# Patient Record
Sex: FEMALE | Race: WHITE | NOT HISPANIC OR LATINO | Employment: UNEMPLOYED | URBAN - METROPOLITAN AREA
[De-identification: names, ages, dates, MRNs, and addresses within clinical notes are randomized per-mention and may not be internally consistent; named-entity substitution may affect disease eponyms.]

---

## 2018-08-20 ENCOUNTER — HOSPITAL ENCOUNTER (INPATIENT)
Facility: MEDICAL CENTER | Age: 23
LOS: 7 days | End: 2018-08-27
Attending: OBSTETRICS & GYNECOLOGY | Admitting: OBSTETRICS & GYNECOLOGY
Payer: OTHER GOVERNMENT

## 2018-08-20 LAB
ABO GROUP BLD: NORMAL
ABO GROUP BLD: NORMAL
ALBUMIN SERPL BCP-MCNC: 3.2 G/DL (ref 3.2–4.9)
ALBUMIN/GLOB SERPL: 1.1 G/DL
ALP SERPL-CCNC: 132 U/L (ref 30–99)
ALT SERPL-CCNC: 17 U/L (ref 2–50)
ANION GAP SERPL CALC-SCNC: 9 MMOL/L (ref 0–11.9)
AST SERPL-CCNC: 23 U/L (ref 12–45)
BILIRUB SERPL-MCNC: 0.2 MG/DL (ref 0.1–1.5)
BLD GP AB SCN SERPL QL: NORMAL
BUN SERPL-MCNC: 12 MG/DL (ref 8–22)
CALCIUM SERPL-MCNC: 8.7 MG/DL (ref 8.5–10.5)
CHLORIDE SERPL-SCNC: 104 MMOL/L (ref 96–112)
CO2 SERPL-SCNC: 20 MMOL/L (ref 20–33)
CREAT SERPL-MCNC: 0.75 MG/DL (ref 0.5–1.4)
ERYTHROCYTE [DISTWIDTH] IN BLOOD BY AUTOMATED COUNT: 43.8 FL (ref 35.9–50)
GLOBULIN SER CALC-MCNC: 2.9 G/DL (ref 1.9–3.5)
GLUCOSE SERPL-MCNC: 78 MG/DL (ref 65–99)
HCT VFR BLD AUTO: 38.7 % (ref 37–47)
HGB BLD-MCNC: 13.1 G/DL (ref 12–16)
MAGNESIUM SERPL-MCNC: 3.8 MG/DL (ref 1.5–2.5)
MAGNESIUM SERPL-MCNC: 4.9 MG/DL (ref 1.5–2.5)
MCH RBC QN AUTO: 31.2 PG (ref 27–33)
MCHC RBC AUTO-ENTMCNC: 33.9 G/DL (ref 33.6–35)
MCV RBC AUTO: 92.1 FL (ref 81.4–97.8)
PLATELET # BLD AUTO: 168 K/UL (ref 164–446)
PMV BLD AUTO: 12.4 FL (ref 9–12.9)
POTASSIUM SERPL-SCNC: 4.2 MMOL/L (ref 3.6–5.5)
PROT SERPL-MCNC: 6.1 G/DL (ref 6–8.2)
RBC # BLD AUTO: 4.2 M/UL (ref 4.2–5.4)
RH BLD: NORMAL
RH BLD: NORMAL
SODIUM SERPL-SCNC: 133 MMOL/L (ref 135–145)
WBC # BLD AUTO: 12.3 K/UL (ref 4.8–10.8)

## 2018-08-20 PROCEDURE — 770002 HCHG ROOM/CARE - OB PRIVATE (112)

## 2018-08-20 PROCEDURE — 302790 HCHG STAT ANTEPARTUM CARE, DAILY

## 2018-08-20 PROCEDURE — 86901 BLOOD TYPING SEROLOGIC RH(D): CPT

## 2018-08-20 PROCEDURE — 700102 HCHG RX REV CODE 250 W/ 637 OVERRIDE(OP): Performed by: OBSTETRICS & GYNECOLOGY

## 2018-08-20 PROCEDURE — 86850 RBC ANTIBODY SCREEN: CPT

## 2018-08-20 PROCEDURE — 36415 COLL VENOUS BLD VENIPUNCTURE: CPT

## 2018-08-20 PROCEDURE — A9270 NON-COVERED ITEM OR SERVICE: HCPCS | Performed by: OBSTETRICS & GYNECOLOGY

## 2018-08-20 PROCEDURE — 85027 COMPLETE CBC AUTOMATED: CPT

## 2018-08-20 PROCEDURE — 80053 COMPREHEN METABOLIC PANEL: CPT

## 2018-08-20 PROCEDURE — 86900 BLOOD TYPING SEROLOGIC ABO: CPT

## 2018-08-20 PROCEDURE — 700111 HCHG RX REV CODE 636 W/ 250 OVERRIDE (IP): Performed by: OBSTETRICS & GYNECOLOGY

## 2018-08-20 PROCEDURE — 700105 HCHG RX REV CODE 258: Performed by: OBSTETRICS & GYNECOLOGY

## 2018-08-20 PROCEDURE — 83735 ASSAY OF MAGNESIUM: CPT | Mod: 91

## 2018-08-20 PROCEDURE — 700101 HCHG RX REV CODE 250: Performed by: OBSTETRICS & GYNECOLOGY

## 2018-08-20 RX ORDER — MAGNESIUM OXIDE 400 MG/1
400 TABLET ORAL DAILY
Status: ON HOLD | COMMUNITY
End: 2018-08-27

## 2018-08-20 RX ORDER — CALCIUM GLUCONATE 94 MG/ML
1 INJECTION, SOLUTION INTRAVENOUS
Status: DISCONTINUED | OUTPATIENT
Start: 2018-08-20 | End: 2018-08-22

## 2018-08-20 RX ORDER — SODIUM CHLORIDE, SODIUM LACTATE, POTASSIUM CHLORIDE, CALCIUM CHLORIDE 600; 310; 30; 20 MG/100ML; MG/100ML; MG/100ML; MG/100ML
INJECTION, SOLUTION INTRAVENOUS CONTINUOUS
Status: DISCONTINUED | OUTPATIENT
Start: 2018-08-20 | End: 2018-08-21

## 2018-08-20 RX ORDER — DOCUSATE SODIUM 100 MG/1
100 CAPSULE, LIQUID FILLED ORAL
Status: DISCONTINUED | OUTPATIENT
Start: 2018-08-20 | End: 2018-08-27 | Stop reason: HOSPADM

## 2018-08-20 RX ORDER — LABETALOL 100 MG/1
100 TABLET, FILM COATED ORAL EVERY 8 HOURS
Status: DISCONTINUED | OUTPATIENT
Start: 2018-08-20 | End: 2018-08-22

## 2018-08-20 RX ORDER — MAGNESIUM SULFATE HEPTAHYDRATE 40 MG/ML
1.5 INJECTION, SOLUTION INTRAVENOUS CONTINUOUS
Status: DISCONTINUED | OUTPATIENT
Start: 2018-08-20 | End: 2018-08-22

## 2018-08-20 RX ORDER — ONDANSETRON 2 MG/ML
4 INJECTION INTRAMUSCULAR; INTRAVENOUS EVERY 6 HOURS PRN
Status: DISCONTINUED | OUTPATIENT
Start: 2018-08-20 | End: 2018-08-22

## 2018-08-20 RX ORDER — LABETALOL HYDROCHLORIDE 5 MG/ML
20-80 INJECTION, SOLUTION INTRAVENOUS PRN
Status: DISCONTINUED | OUTPATIENT
Start: 2018-08-20 | End: 2018-08-26

## 2018-08-20 RX ORDER — HYDRALAZINE HYDROCHLORIDE 20 MG/ML
5-10 INJECTION INTRAMUSCULAR; INTRAVENOUS PRN
Status: DISCONTINUED | OUTPATIENT
Start: 2018-08-20 | End: 2018-08-26

## 2018-08-20 RX ORDER — BETAMETHASONE SODIUM PHOSPHATE AND BETAMETHASONE ACETATE 3; 3 MG/ML; MG/ML
12 INJECTION, SUSPENSION INTRA-ARTICULAR; INTRALESIONAL; INTRAMUSCULAR; SOFT TISSUE ONCE
Status: COMPLETED | OUTPATIENT
Start: 2018-08-21 | End: 2018-08-21

## 2018-08-20 RX ADMIN — LABETALOL HYDROCHLORIDE 100 MG: 100 TABLET, FILM COATED ORAL at 18:02

## 2018-08-20 RX ADMIN — MAGNESIUM SULFATE IN WATER 3 G/HR: 40 INJECTION, SOLUTION INTRAVENOUS at 23:15

## 2018-08-20 RX ADMIN — MAGNESIUM SULFATE IN WATER 3 G/HR: 40 INJECTION, SOLUTION INTRAVENOUS at 16:45

## 2018-08-20 RX ADMIN — LABETALOL HYDROCHLORIDE 20 MG: 5 INJECTION, SOLUTION INTRAVENOUS at 16:59

## 2018-08-20 RX ADMIN — SODIUM CHLORIDE, POTASSIUM CHLORIDE, SODIUM LACTATE AND CALCIUM CHLORIDE: 600; 310; 30; 20 INJECTION, SOLUTION INTRAVENOUS at 17:03

## 2018-08-20 ASSESSMENT — PATIENT HEALTH QUESTIONNAIRE - PHQ9
2. FEELING DOWN, DEPRESSED, IRRITABLE, OR HOPELESS: NOT AT ALL
1. LITTLE INTEREST OR PLEASURE IN DOING THINGS: NOT AT ALL
SUM OF ALL RESPONSES TO PHQ9 QUESTIONS 1 AND 2: 0

## 2018-08-20 ASSESSMENT — LIFESTYLE VARIABLES
ALCOHOL_USE: NO
EVER_SMOKED: NEVER

## 2018-08-20 ASSESSMENT — PAIN SCALES - GENERAL
PAINLEVEL_OUTOF10: 0
PAINLEVEL_OUTOF10: 0

## 2018-08-21 LAB
ALBUMIN SERPL BCP-MCNC: 3.1 G/DL (ref 3.2–4.9)
ALBUMIN/GLOB SERPL: 1 G/DL
ALP SERPL-CCNC: 131 U/L (ref 30–99)
ALT SERPL-CCNC: 17 U/L (ref 2–50)
ANION GAP SERPL CALC-SCNC: 12 MMOL/L (ref 0–11.9)
AST SERPL-CCNC: 26 U/L (ref 12–45)
BILIRUB SERPL-MCNC: 0.3 MG/DL (ref 0.1–1.5)
BUN SERPL-MCNC: 11 MG/DL (ref 8–22)
CALCIUM SERPL-MCNC: 7.9 MG/DL (ref 8.5–10.5)
CHLORIDE SERPL-SCNC: 98 MMOL/L (ref 96–112)
CO2 SERPL-SCNC: 20 MMOL/L (ref 20–33)
CREAT SERPL-MCNC: 0.79 MG/DL (ref 0.5–1.4)
ERYTHROCYTE [DISTWIDTH] IN BLOOD BY AUTOMATED COUNT: 45.6 FL (ref 35.9–50)
GLOBULIN SER CALC-MCNC: 3 G/DL (ref 1.9–3.5)
GLUCOSE SERPL-MCNC: 124 MG/DL (ref 65–99)
HCT VFR BLD AUTO: 39.1 % (ref 37–47)
HGB BLD-MCNC: 12.9 G/DL (ref 12–16)
MAGNESIUM SERPL-MCNC: 5.6 MG/DL (ref 1.5–2.5)
MAGNESIUM SERPL-MCNC: 6 MG/DL (ref 1.5–2.5)
MAGNESIUM SERPL-MCNC: 6.5 MG/DL (ref 1.5–2.5)
MAGNESIUM SERPL-MCNC: 6.7 MG/DL (ref 1.5–2.5)
MCH RBC QN AUTO: 31.2 PG (ref 27–33)
MCHC RBC AUTO-ENTMCNC: 33 G/DL (ref 33.6–35)
MCV RBC AUTO: 94.4 FL (ref 81.4–97.8)
PLATELET # BLD AUTO: 175 K/UL (ref 164–446)
PMV BLD AUTO: 12.5 FL (ref 9–12.9)
POTASSIUM SERPL-SCNC: 5 MMOL/L (ref 3.6–5.5)
PROT SERPL-MCNC: 6.1 G/DL (ref 6–8.2)
RBC # BLD AUTO: 4.14 M/UL (ref 4.2–5.4)
SODIUM SERPL-SCNC: 130 MMOL/L (ref 135–145)
WBC # BLD AUTO: 15.4 K/UL (ref 4.8–10.8)

## 2018-08-21 PROCEDURE — 770002 HCHG ROOM/CARE - OB PRIVATE (112)

## 2018-08-21 PROCEDURE — 302790 HCHG STAT ANTEPARTUM CARE, DAILY

## 2018-08-21 PROCEDURE — 36415 COLL VENOUS BLD VENIPUNCTURE: CPT

## 2018-08-21 PROCEDURE — 80053 COMPREHEN METABOLIC PANEL: CPT

## 2018-08-21 PROCEDURE — 700105 HCHG RX REV CODE 258: Performed by: OBSTETRICS & GYNECOLOGY

## 2018-08-21 PROCEDURE — 83735 ASSAY OF MAGNESIUM: CPT | Mod: 91

## 2018-08-21 PROCEDURE — A9270 NON-COVERED ITEM OR SERVICE: HCPCS | Performed by: OBSTETRICS & GYNECOLOGY

## 2018-08-21 PROCEDURE — 85027 COMPLETE CBC AUTOMATED: CPT

## 2018-08-21 PROCEDURE — 700111 HCHG RX REV CODE 636 W/ 250 OVERRIDE (IP): Performed by: OBSTETRICS & GYNECOLOGY

## 2018-08-21 PROCEDURE — 700102 HCHG RX REV CODE 250 W/ 637 OVERRIDE(OP): Performed by: OBSTETRICS & GYNECOLOGY

## 2018-08-21 RX ORDER — ACETAMINOPHEN 325 MG/1
650 TABLET ORAL EVERY 6 HOURS PRN
Status: DISCONTINUED | OUTPATIENT
Start: 2018-08-21 | End: 2018-08-27 | Stop reason: HOSPADM

## 2018-08-21 RX ORDER — SODIUM CHLORIDE 9 MG/ML
INJECTION, SOLUTION INTRAVENOUS CONTINUOUS
Status: DISCONTINUED | OUTPATIENT
Start: 2018-08-21 | End: 2018-08-25

## 2018-08-21 RX ADMIN — BETAMETHASONE SODIUM PHOSPHATE AND BETAMETHASONE ACETATE 12 MG: 3; 3 INJECTION, SUSPENSION INTRA-ARTICULAR; INTRALESIONAL; INTRAMUSCULAR at 14:26

## 2018-08-21 RX ADMIN — MAGNESIUM SULFATE IN WATER 1.5 G/HR: 40 INJECTION, SOLUTION INTRAVENOUS at 18:48

## 2018-08-21 RX ADMIN — ACETAMINOPHEN 650 MG: 325 TABLET, FILM COATED ORAL at 09:30

## 2018-08-21 RX ADMIN — MAGNESIUM SULFATE IN WATER 2 G/HR: 40 INJECTION, SOLUTION INTRAVENOUS at 06:15

## 2018-08-21 RX ADMIN — SODIUM CHLORIDE, POTASSIUM CHLORIDE, SODIUM LACTATE AND CALCIUM CHLORIDE: 600; 310; 30; 20 INJECTION, SOLUTION INTRAVENOUS at 06:16

## 2018-08-21 RX ADMIN — LABETALOL HYDROCHLORIDE 100 MG: 100 TABLET, FILM COATED ORAL at 01:45

## 2018-08-21 RX ADMIN — LABETALOL HYDROCHLORIDE 100 MG: 100 TABLET, FILM COATED ORAL at 10:11

## 2018-08-21 RX ADMIN — LABETALOL HYDROCHLORIDE 100 MG: 100 TABLET, FILM COATED ORAL at 17:46

## 2018-08-21 RX ADMIN — SODIUM CHLORIDE: 9 INJECTION, SOLUTION INTRAVENOUS at 09:21

## 2018-08-21 ASSESSMENT — PAIN SCALES - GENERAL
PAINLEVEL_OUTOF10: 0
PAINLEVEL_OUTOF10: 0

## 2018-08-21 NOTE — PROGRESS NOTES
Tiffanie from Lab called with critical result of Magnesium at 6.7. Critical lab result read back to Tiffanie.   This critical lab result is within parameters established by  for this patient

## 2018-08-21 NOTE — H&P
REASON FOR TRANSFER:  Severe preeclampsia.    REFERRING PHYSICIAN:  Dr. Watson    REFERRING INSTITUTION:  [facility] in [place].    HISTORY OF PRESENT ILLNESS:  A 23-year-old  1, para 0, EDC 10/08/2018   recently transferred from [place] to [place] due to  service of her   spouse.  Patient reports that she has had about a 2-week history of lower   extremity edema, 1 week history of upper extremity edema, was being seen for   initial intake and was found to have elevated blood pressures.  The patient   conferred with a friend or sister and presented to [facility] where she was found   to be having evidence of preeclampsia.    She has been told that she had a marginal or low lying placenta in previous   examinations.  There has never been a history of bleeding.  She also reports   having an abnormal 1 hour GCT followed by normal 3-hour GTT.  She does not   know if she has had aneuploidy screening or OSB screening.    Prenatal care at the [facility] in [place].    PAST MEDICAL HISTORY:  She denies any major medical problems including asthma,   seizures, hypertension, cardiovascular, GI or  diseases.    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    TRANSFUSIONS:  None.    ALLERGIES:  None.    VENEREAL DISEASE HISTORY:  Negative.    HABITS:  Tobacco, alcohol and drug use denied.    MEDICATIONS:  None.    FAMILY HISTORY:  Negative for birth defects or inheritable diseases.    PHYSICAL EXAMINATION:  GENERAL:  Well-developed, well-nourished, obese female, alert and oriented x3.  VITAL SIGNS:  Her blood pressure on arrival was 179/115.  Patient did receive   20 mg of IV labetalol and pressure currently is now 159/90.  Pulse rate 67.  HEAD:  Normocephalic.  EYES:  No scleral icterus or subconjunctival pallor.  Pupils equal, reactive   to light and accommodation.  Extraocular movements symmetrical.  EAR, NOSE AND THROAT:  Grossly within normal limits.  No evidence of    thyromegaly.  LUNGS:  Clear.  HEART:  Regular rate and rhythm.  Normal S1 and S2.  No S3, S4 or murmurs.  ABDOMEN:  Soft, gravid uterus.  EXTREMITIES:  2+ pitting edema.  Reflexes 2-3+.  The patient is currently on   magnesium sulfate.    LABORATORY DATA:  The patient is A positive.  Her hemoglobin 13.1, hematocrit   38.7%, platelet count 168,000.  Chemistry studies reveals normal liver   enzymes, BUN elevated at 12.  Electrolytes:  Sodium 133, others are normal.    Serum creatinine 0.75.  Patient had 3+ proteinuria in [place].    Bedside ultrasound reveals caballero fetus, estimated fetal weight  4 pounds 4 ounces, limited ultrasound  due to   maternal habitus, but appears to have aneurysmal flap to the foramen of.    There is no evidence of YOLY was 7.54.  SD ratio of 3.24.  Baby does appear to   be slightly IUGR primarily due to decrease head circumference; however, this   may be artifactual due to low presenting part.    Transvaginal ultrasound revealed no evidence of placenta previa, placental   edge is 19 mm away from the internal os.  Fetal heart rate tracing is   reactive, no decelerations at this time.  Contractions are noted approximately   every 3-5 minutes and nerve regular.  Patient is not aware of contractions.    ASSESSMENT:  1.  Intrauterine pregnancy at 33 weeks 0 days.  2.  Preeclampsia.  3.  Decreased at circumference, but may be artifactual.  4.  No evidence of previa.    PLAN:  1.  At the present time, complete corticosteroids window and maintained on   magnesium sulfate.  2.  Daily labs.  Consultation ration of steroids and try to at least obtain 48   hours of corticosteroids.  Rationale steroid therapy was discussed.    Management will be then there will be on a day-to-day basis should there be   evidence of severe disease either by blood pressure parameters or chemistries,   her hematology delivered to be indicated.  3.  Nonreassuring fetal status will also be indication for delivery.    All  questions answered to her satisfaction.  She is aware that she is in for   the duration of pregnancy.    Time:   90 minutes       ____________________________________     MD ALFRED CHO / RADHA    DD:  08/20/2018 19:38:17  DT:  08/20/2018 21:04:19    D#:  [id number]  Job#:  [id number]

## 2018-08-21 NOTE — PROGRESS NOTES
1910- Report received from ERIN Hopson RN. Pt resting in bed. Yousif (FOB) at bedside. POC discussed.     2204- Received call from Dr. Yi. Update provided regarding pt status and recent BPs.    0242- Report to Dr. Yi regarding Magnesium level 6.7. Received order to change rate to 2 gm/hr.    0710- Report given to JUNG Peterson RN. POC discussed.

## 2018-08-21 NOTE — PROGRESS NOTES
"; EDC 10/8; EGA 33    1745 - Report from JUNG Bruno, RN. Pt s/p magnesium bolus, magnesium infusion running per active MAR order. Admission profile complete. Lab at bedside to draw labs. Serial BPs being taken, see flowsheet and MAR for intervention. Pt without complaints at this time. POC discussed, questions answered.    - Dr. Yi updated via phone. Orders received  1750 - FOB \"Yousif\" at bedside.    - 24 hour urine collection started.    - Dr. Yi at bedside with u/s  0710 - Report to TYREE Brown RN    "

## 2018-08-21 NOTE — PROGRESS NOTES
Lab called, critical magnesium level of 6.9, Dr. Yi notified.  Orders to decrease magnesium infusion to 1.5 mg/hr.    0735-In room to assess.  Refexes=+2, breath sounds clear bilaterally.  Pt has no c/o difficulty breathing, alert and oriented.  0800-Pt sitting up-eating.0825-Tylenol given for HA.  0830-severe emesis, loss of bladder control, large amount of urine on pad and bed.  Pt up to BR, rinsed off, back in bed, feeling better.  24 hour restarted at 0830.  0930-Tylenol given again due to vomiting tylenol.  1845-Pt c/o cramping.  TOCO adjusted.  Pt encouraged to communicate change in cramping.  Pt states she thinks she may have to have a bowel movement.  Pt up to BR, was unable to have one-but feels better.   Anticipate report at 1900, and patient care assumed by night RN.

## 2018-08-21 NOTE — PROGRESS NOTES
S: Feels well. No complaints  O: Afebrile       BP: 134/84 mmHg       Edema: 2+       EFM Baseline 120, reactive, no decelerations.       Results for SUNDAY ANTHONY (MRN [id number]) as of 2018 07:24   Ref. Range 2018 01:17   Sodium Latest Ref Range: 135 - 145 mmol/L 130 (L)   Potassium Latest Ref Range: 3.6 - 5.5 mmol/L 5.0   Chloride Latest Ref Range: 96 - 112 mmol/L 98   Co2 Latest Ref Range: 20 - 33 mmol/L 20   Anion Gap Latest Ref Range: 0.0 - 11.9  12.0 (H)   Glucose Latest Ref Range: 65 - 99 mg/dL 124 (H)   Bun Latest Ref Range: 8 - 22 mg/dL 11   Creatinine Latest Ref Range: 0.50 - 1.40 mg/dL 0.79   GFR If  Latest Ref Range: >60 mL/min/1.73 m 2 >60   GFR If Non  Latest Ref Range: >60 mL/min/1.73 m 2 >60   Calcium Latest Ref Range: 8.5 - 10.5 mg/dL 7.9 (L)   AST(SGOT) Latest Ref Range: 12 - 45 U/L 26   ALT(SGPT) Latest Ref Range: 2 - 50 U/L 17   Alkaline Phosphatase Latest Ref Range: 30 - 99 U/L 131 (H)   Total Bilirubin Latest Ref Range: 0.1 - 1.5 mg/dL 0.3   Albumin Latest Ref Range: 3.2 - 4.9 g/dL 3.1 (L)   Total Protein Latest Ref Range: 6.0 - 8.2 g/dL 6.1   Globulin Latest Ref Range: 1.9 - 3.5 g/dL 3.0   A-G Ratio Latest Units: g/dL 1.0   Magnesium Latest Ref Range: 1.5 - 2.5 mg/dL 6.7 (HH)   Results for SUNDAY ANTHONY (MRN [id number]) as of 2018 07:24   Ref. Range 2018 01:17   Platelet Count Latest Ref Range: 164 - 446 K/uL 175   A: IUP 33 1/7 weeks      Preeclampsia BP stable on labetalol 100 mg TID      Mild hyponatremia  P:  Adjust magnesium sulfate as needed.         Change mainline to NS       Monitor labs       If in labor will anticipate     Time:  25 minutes

## 2018-08-21 NOTE — CARE PLAN
Problem: Powerlessness  Goal: Patient will express individual needs/desires  Outcome: PROGRESSING AS EXPECTED  Pt encouraged to communicate needs, encouraged to ask questions about hospitilization    Problem: Risk for Infection, Impaired Wound Healing  Goal: Remain free from signs and symptoms of infection    Intervention: Infection prevention measures  Hand hygiene before and after pt care

## 2018-08-22 LAB
ALBUMIN SERPL BCP-MCNC: 3 G/DL (ref 3.2–4.9)
ALBUMIN/GLOB SERPL: 1.1 G/DL
ALP SERPL-CCNC: 123 U/L (ref 30–99)
ALT SERPL-CCNC: 17 U/L (ref 2–50)
ANION GAP SERPL CALC-SCNC: 12 MMOL/L (ref 0–11.9)
AST SERPL-CCNC: 23 U/L (ref 12–45)
BILIRUB SERPL-MCNC: 0.2 MG/DL (ref 0.1–1.5)
BUN SERPL-MCNC: 15 MG/DL (ref 8–22)
CALCIUM SERPL-MCNC: 7.1 MG/DL (ref 8.5–10.5)
CHLORIDE SERPL-SCNC: 99 MMOL/L (ref 96–112)
CO2 SERPL-SCNC: 21 MMOL/L (ref 20–33)
CREAT 24H UR-MSRATE: 1952 MG/24 HR (ref 800–1800)
CREAT SERPL-MCNC: 0.78 MG/DL (ref 0.5–1.4)
CREAT UR-MCNC: 57.4 MG/DL
ERYTHROCYTE [DISTWIDTH] IN BLOOD BY AUTOMATED COUNT: 44 FL (ref 35.9–50)
GLOBULIN SER CALC-MCNC: 2.7 G/DL (ref 1.9–3.5)
GLUCOSE SERPL-MCNC: 134 MG/DL (ref 65–99)
HCT VFR BLD AUTO: 34.5 % (ref 37–47)
HGB BLD-MCNC: 11.9 G/DL (ref 12–16)
MAGNESIUM SERPL-MCNC: 5.8 MG/DL (ref 1.5–2.5)
MAGNESIUM SERPL-MCNC: 6 MG/DL (ref 1.5–2.5)
MAGNESIUM SERPL-MCNC: 6.1 MG/DL (ref 1.5–2.5)
MCH RBC QN AUTO: 32 PG (ref 27–33)
MCHC RBC AUTO-ENTMCNC: 34.5 G/DL (ref 33.6–35)
MCV RBC AUTO: 92.7 FL (ref 81.4–97.8)
PLATELET # BLD AUTO: 172 K/UL (ref 164–446)
PMV BLD AUTO: 12.4 FL (ref 9–12.9)
POTASSIUM SERPL-SCNC: 4.8 MMOL/L (ref 3.6–5.5)
PROT 24H UR-MCNC: 2580.6 MG/24 HR (ref 30–150)
PROT 24H UR-MRATE: 75.9 MG/DL (ref 0–15)
PROT SERPL-MCNC: 5.7 G/DL (ref 6–8.2)
RBC # BLD AUTO: 3.72 M/UL (ref 4.2–5.4)
SODIUM SERPL-SCNC: 132 MMOL/L (ref 135–145)
SPECIMEN VOL UR: 3400 ML
SPECIMEN VOL UR: 3400 ML
WBC # BLD AUTO: 15 K/UL (ref 4.8–10.8)

## 2018-08-22 PROCEDURE — 770002 HCHG ROOM/CARE - OB PRIVATE (112)

## 2018-08-22 PROCEDURE — 700105 HCHG RX REV CODE 258: Performed by: OBSTETRICS & GYNECOLOGY

## 2018-08-22 PROCEDURE — 700102 HCHG RX REV CODE 250 W/ 637 OVERRIDE(OP): Performed by: OBSTETRICS & GYNECOLOGY

## 2018-08-22 PROCEDURE — 83735 ASSAY OF MAGNESIUM: CPT

## 2018-08-22 PROCEDURE — 82570 ASSAY OF URINE CREATININE: CPT

## 2018-08-22 PROCEDURE — A9270 NON-COVERED ITEM OR SERVICE: HCPCS | Performed by: OBSTETRICS & GYNECOLOGY

## 2018-08-22 PROCEDURE — 302790 HCHG STAT ANTEPARTUM CARE, DAILY

## 2018-08-22 PROCEDURE — 85027 COMPLETE CBC AUTOMATED: CPT

## 2018-08-22 PROCEDURE — 81050 URINALYSIS VOLUME MEASURE: CPT | Mod: 91

## 2018-08-22 PROCEDURE — 84156 ASSAY OF PROTEIN URINE: CPT

## 2018-08-22 PROCEDURE — 700111 HCHG RX REV CODE 636 W/ 250 OVERRIDE (IP): Performed by: OBSTETRICS & GYNECOLOGY

## 2018-08-22 PROCEDURE — 36415 COLL VENOUS BLD VENIPUNCTURE: CPT

## 2018-08-22 PROCEDURE — 80053 COMPREHEN METABOLIC PANEL: CPT

## 2018-08-22 RX ORDER — LABETALOL 100 MG/1
200 TABLET, FILM COATED ORAL EVERY 8 HOURS
Status: DISCONTINUED | OUTPATIENT
Start: 2018-08-23 | End: 2018-08-25

## 2018-08-22 RX ADMIN — SODIUM CHLORIDE: 9 INJECTION, SOLUTION INTRAVENOUS at 02:15

## 2018-08-22 RX ADMIN — LABETALOL HYDROCHLORIDE 100 MG: 100 TABLET, FILM COATED ORAL at 02:15

## 2018-08-22 RX ADMIN — LABETALOL HYDROCHLORIDE 100 MG: 100 TABLET, FILM COATED ORAL at 18:01

## 2018-08-22 RX ADMIN — MAGNESIUM SULFATE IN WATER 1.5 G/HR: 40 INJECTION, SOLUTION INTRAVENOUS at 08:06

## 2018-08-22 RX ADMIN — LABETALOL HYDROCHLORIDE 100 MG: 100 TABLET, FILM COATED ORAL at 09:56

## 2018-08-22 ASSESSMENT — PAIN SCALES - GENERAL
PAINLEVEL_OUTOF10: 0

## 2018-08-22 ASSESSMENT — PATIENT HEALTH QUESTIONNAIRE - PHQ9
1. LITTLE INTEREST OR PLEASURE IN DOING THINGS: NOT AT ALL
1. LITTLE INTEREST OR PLEASURE IN DOING THINGS: NOT AT ALL
2. FEELING DOWN, DEPRESSED, IRRITABLE, OR HOPELESS: NOT AT ALL
SUM OF ALL RESPONSES TO PHQ9 QUESTIONS 1 AND 2: 0
SUM OF ALL RESPONSES TO PHQ9 QUESTIONS 1 AND 2: 0
2. FEELING DOWN, DEPRESSED, IRRITABLE, OR HOPELESS: NOT AT ALL

## 2018-08-22 NOTE — PROGRESS NOTES
"0700 - Report from ABEL Brand RN. Pt sleeping at this time.   0750 - [place] from Lab called with critical result of magnesium at 6.0. Critical lab result read back to [place].   This critical lab result is within parameters established by  for this patient  0830 - 24 hour urine complete, taken to lab.   0905 - Assessment complete. Lungs sounds clear bilaterally. Abd soft, non tender. Pt states that she is feeling much better since the mag was turned down yesterday. Pt reports + FM, denies UCs, LOF, VB. POC discussed with patient, questions answered.   0930 - Dr. Yi updated. Orders to d/c magnesium at 1430. Pt may shower after the d/c of mag.   1330 - [place] from Lab called with critical result of magnesium of at 6.1. Critical lab result read back to Diane.   This critical lab result is within parameters established by  for this patient  1440 - Magnesium stopped per MD order.   1530 - Pt states she is feeling slight cramping in lower abd. Abd soft to palpation, toco quiet. Encouraged pt to notify nurse of any changes.   1700 - Pt up to bathroom to shower. Pt denies any cramping at this time, states \"it just lasted a little bit last time, it's gone now\". No other complaints.   1755 - EFM and toco placed  1900 - Report to ABEL Brand RN    "

## 2018-08-22 NOTE — PROGRESS NOTES
1900 report from RNJUNG. Pt alert, resting in bed on magnesium. Pt denies needs at this time, though uncomfortable due to mag. Pt encouraged to voice needs and ask questions as needed. Family at bedside. VSS- will continue to monitor.    0700 report to day shift RN. Pt resting in bed w/ family sleeping at bedside. Pt had uneventful evening- VSS and up to bathroom several times w/ SBA x1. POC to possibly DC magnesium today once steroids have been on board > or = 48 hours. All questions answered.

## 2018-08-22 NOTE — CARE PLAN
Problem: Risk for Infection, Impaired Wound Healing  Goal: Remain free from signs and symptoms of infection  Outcome: PROGRESSING AS EXPECTED  VSS- will continue to monitor. Pt educated on hand hygiene    Problem: Pain  Goal: Alleviation of Pain or a reduction in pain to the patient's comfort goal  Outcome: PROGRESSING AS EXPECTED  Pt will receive pain meds PRN in addition to heat packs/ ice packs as requested

## 2018-08-23 LAB
ALBUMIN SERPL BCP-MCNC: 2.7 G/DL (ref 3.2–4.9)
ALBUMIN SERPL BCP-MCNC: 3.1 G/DL (ref 3.2–4.9)
ALBUMIN/GLOB SERPL: 1.1 G/DL
ALBUMIN/GLOB SERPL: 1.2 G/DL
ALP SERPL-CCNC: 110 U/L (ref 30–99)
ALP SERPL-CCNC: 132 U/L (ref 30–99)
ALT SERPL-CCNC: 18 U/L (ref 2–50)
ALT SERPL-CCNC: 33 U/L (ref 2–50)
ANION GAP SERPL CALC-SCNC: 11 MMOL/L (ref 0–11.9)
ANION GAP SERPL CALC-SCNC: 6 MMOL/L (ref 0–11.9)
AST SERPL-CCNC: 23 U/L (ref 12–45)
AST SERPL-CCNC: 38 U/L (ref 12–45)
BASOPHILS # BLD AUTO: 0.3 % (ref 0–1.8)
BASOPHILS # BLD: 0.04 K/UL (ref 0–0.12)
BILIRUB SERPL-MCNC: 0.2 MG/DL (ref 0.1–1.5)
BILIRUB SERPL-MCNC: 0.2 MG/DL (ref 0.1–1.5)
BUN SERPL-MCNC: 15 MG/DL (ref 8–22)
BUN SERPL-MCNC: 16 MG/DL (ref 8–22)
CALCIUM SERPL-MCNC: 7 MG/DL (ref 8.5–10.5)
CALCIUM SERPL-MCNC: 8.3 MG/DL (ref 8.5–10.5)
CHLORIDE SERPL-SCNC: 103 MMOL/L (ref 96–112)
CHLORIDE SERPL-SCNC: 106 MMOL/L (ref 96–112)
CO2 SERPL-SCNC: 22 MMOL/L (ref 20–33)
CO2 SERPL-SCNC: 22 MMOL/L (ref 20–33)
CREAT SERPL-MCNC: 0.77 MG/DL (ref 0.5–1.4)
CREAT SERPL-MCNC: 0.9 MG/DL (ref 0.5–1.4)
EOSINOPHIL # BLD AUTO: 0.04 K/UL (ref 0–0.51)
EOSINOPHIL NFR BLD: 0.3 % (ref 0–6.9)
ERYTHROCYTE [DISTWIDTH] IN BLOOD BY AUTOMATED COUNT: 45.9 FL (ref 35.9–50)
ERYTHROCYTE [DISTWIDTH] IN BLOOD BY AUTOMATED COUNT: 46.6 FL (ref 35.9–50)
GLOBULIN SER CALC-MCNC: 2.3 G/DL (ref 1.9–3.5)
GLOBULIN SER CALC-MCNC: 2.8 G/DL (ref 1.9–3.5)
GLUCOSE SERPL-MCNC: 84 MG/DL (ref 65–99)
GLUCOSE SERPL-MCNC: 85 MG/DL (ref 65–99)
HCT VFR BLD AUTO: 34.7 % (ref 37–47)
HCT VFR BLD AUTO: 38.8 % (ref 37–47)
HGB BLD-MCNC: 11.4 G/DL (ref 12–16)
HGB BLD-MCNC: 12.6 G/DL (ref 12–16)
IMM GRANULOCYTES # BLD AUTO: 0.28 K/UL (ref 0–0.11)
IMM GRANULOCYTES NFR BLD AUTO: 2 % (ref 0–0.9)
LACTATE BLD-SCNC: 2.2 MMOL/L (ref 0.5–2)
LDH SERPL L TO P-CCNC: 240 U/L (ref 107–266)
LYMPHOCYTES # BLD AUTO: 3.32 K/UL (ref 1–4.8)
LYMPHOCYTES NFR BLD: 23.3 % (ref 22–41)
MCH RBC QN AUTO: 31 PG (ref 27–33)
MCH RBC QN AUTO: 31.8 PG (ref 27–33)
MCHC RBC AUTO-ENTMCNC: 32.5 G/DL (ref 33.6–35)
MCHC RBC AUTO-ENTMCNC: 32.9 G/DL (ref 33.6–35)
MCV RBC AUTO: 95.6 FL (ref 81.4–97.8)
MCV RBC AUTO: 96.7 FL (ref 81.4–97.8)
MONOCYTES # BLD AUTO: 1.24 K/UL (ref 0–0.85)
MONOCYTES NFR BLD AUTO: 8.7 % (ref 0–13.4)
NEUTROPHILS # BLD AUTO: 9.33 K/UL (ref 2–7.15)
NEUTROPHILS NFR BLD: 65.4 % (ref 44–72)
NRBC # BLD AUTO: 0.04 K/UL
NRBC BLD-RTO: 0.3 /100 WBC
PLATELET # BLD AUTO: 166 K/UL (ref 164–446)
PLATELET # BLD AUTO: 180 K/UL (ref 164–446)
PMV BLD AUTO: 12 FL (ref 9–12.9)
PMV BLD AUTO: 12.8 FL (ref 9–12.9)
POTASSIUM SERPL-SCNC: 4.3 MMOL/L (ref 3.6–5.5)
POTASSIUM SERPL-SCNC: 4.5 MMOL/L (ref 3.6–5.5)
PROT SERPL-MCNC: 5 G/DL (ref 6–8.2)
PROT SERPL-MCNC: 5.9 G/DL (ref 6–8.2)
RBC # BLD AUTO: 3.59 M/UL (ref 4.2–5.4)
RBC # BLD AUTO: 4.06 M/UL (ref 4.2–5.4)
SODIUM SERPL-SCNC: 134 MMOL/L (ref 135–145)
SODIUM SERPL-SCNC: 136 MMOL/L (ref 135–145)
URATE SERPL-MCNC: 8.3 MG/DL (ref 1.9–8.2)
WBC # BLD AUTO: 13.4 K/UL (ref 4.8–10.8)
WBC # BLD AUTO: 14.3 K/UL (ref 4.8–10.8)

## 2018-08-23 PROCEDURE — A9270 NON-COVERED ITEM OR SERVICE: HCPCS | Performed by: OBSTETRICS & GYNECOLOGY

## 2018-08-23 PROCEDURE — 85027 COMPLETE CBC AUTOMATED: CPT

## 2018-08-23 PROCEDURE — 84156 ASSAY OF PROTEIN URINE: CPT

## 2018-08-23 PROCEDURE — 82570 ASSAY OF URINE CREATININE: CPT

## 2018-08-23 PROCEDURE — 770002 HCHG ROOM/CARE - OB PRIVATE (112)

## 2018-08-23 PROCEDURE — 84550 ASSAY OF BLOOD/URIC ACID: CPT

## 2018-08-23 PROCEDURE — 59025 FETAL NON-STRESS TEST: CPT | Performed by: OBSTETRICS & GYNECOLOGY

## 2018-08-23 PROCEDURE — 83615 LACTATE (LD) (LDH) ENZYME: CPT

## 2018-08-23 PROCEDURE — 85025 COMPLETE CBC W/AUTO DIFF WBC: CPT

## 2018-08-23 PROCEDURE — 83605 ASSAY OF LACTIC ACID: CPT

## 2018-08-23 PROCEDURE — 700102 HCHG RX REV CODE 250 W/ 637 OVERRIDE(OP): Performed by: OBSTETRICS & GYNECOLOGY

## 2018-08-23 PROCEDURE — 700111 HCHG RX REV CODE 636 W/ 250 OVERRIDE (IP): Performed by: OBSTETRICS & GYNECOLOGY

## 2018-08-23 PROCEDURE — 36415 COLL VENOUS BLD VENIPUNCTURE: CPT

## 2018-08-23 PROCEDURE — 80053 COMPREHEN METABOLIC PANEL: CPT

## 2018-08-23 RX ORDER — DEXTROSE, SODIUM CHLORIDE, SODIUM LACTATE, POTASSIUM CHLORIDE, AND CALCIUM CHLORIDE 5; .6; .31; .03; .02 G/100ML; G/100ML; G/100ML; G/100ML; G/100ML
INJECTION, SOLUTION INTRAVENOUS CONTINUOUS
Status: DISCONTINUED | OUTPATIENT
Start: 2018-08-24 | End: 2018-08-25

## 2018-08-23 RX ORDER — HYDRALAZINE HYDROCHLORIDE 20 MG/ML
10 INJECTION INTRAMUSCULAR; INTRAVENOUS ONCE
Status: COMPLETED | OUTPATIENT
Start: 2018-08-23 | End: 2018-08-23

## 2018-08-23 RX ADMIN — LABETALOL HYDROCHLORIDE 200 MG: 100 TABLET, FILM COATED ORAL at 02:32

## 2018-08-23 RX ADMIN — HYDRALAZINE HYDROCHLORIDE 5 MG: 20 INJECTION INTRAMUSCULAR; INTRAVENOUS at 17:14

## 2018-08-23 RX ADMIN — LABETALOL HYDROCHLORIDE 200 MG: 100 TABLET, FILM COATED ORAL at 10:14

## 2018-08-23 RX ADMIN — ACETAMINOPHEN 650 MG: 325 TABLET, FILM COATED ORAL at 21:44

## 2018-08-23 RX ADMIN — HYDRALAZINE HYDROCHLORIDE 10 MG: 20 INJECTION INTRAMUSCULAR; INTRAVENOUS at 18:02

## 2018-08-23 RX ADMIN — LABETALOL HYDROCHLORIDE 200 MG: 100 TABLET, FILM COATED ORAL at 18:02

## 2018-08-23 RX ADMIN — HYDRALAZINE HYDROCHLORIDE 10 MG: 20 INJECTION INTRAMUSCULAR; INTRAVENOUS at 20:01

## 2018-08-23 ASSESSMENT — PATIENT HEALTH QUESTIONNAIRE - PHQ9
2. FEELING DOWN, DEPRESSED, IRRITABLE, OR HOPELESS: NOT AT ALL
SUM OF ALL RESPONSES TO PHQ9 QUESTIONS 1 AND 2: 0
1. LITTLE INTEREST OR PLEASURE IN DOING THINGS: NOT AT ALL
SUM OF ALL RESPONSES TO PHQ9 QUESTIONS 1 AND 2: 0
1. LITTLE INTEREST OR PLEASURE IN DOING THINGS: NOT AT ALL
2. FEELING DOWN, DEPRESSED, IRRITABLE, OR HOPELESS: NOT AT ALL

## 2018-08-23 ASSESSMENT — PAIN SCALES - GENERAL
PAINLEVEL_OUTOF10: 0

## 2018-08-23 NOTE — CARE PLAN
Problem: Risk for Infection, Impaired Wound Healing  Goal: Remain free from signs and symptoms of infection  Outcome: PROGRESSING AS EXPECTED  VSS- will continue to monitor.    Problem: Pain  Goal: Alleviation of Pain or a reduction in pain to the patient's comfort goal  Outcome: PROGRESSING AS EXPECTED  Will admin pain meds as needed and monitor for cxns

## 2018-08-23 NOTE — PROGRESS NOTES
1900 bedside report from RNERIN. Pt stable and alert, resting comfortably in bed and eating dinner. Pt denies needs at this time. All questions answered.     2107 K[address] REEVES at bedside. POC discussed. Mag turned off today, will DC fluids and SL IV at this point. Cont FHT and TOCO monitoring no longer needed- orders for NST qshift. q4H BP as long as they are WDL. Labetalol will be increased from 100mg to 200mg TID for labile BP today.    0700 report to day shift RN. Pt sleeping. All questions answered. VSS.

## 2018-08-23 NOTE — PROGRESS NOTES
0700: Report received from ABEL Brand RN. POC discussed.  1010: Assessment complete. Pt denies pain. Denies UCs, cramping, LOF, VB, +FM. Diet order changed to low sodium. FOB at bedside.  1630: Pt BP consistently elevated. Dr. Yi updated. He will be on the unit shortly.  1705: Orders received to give 5 mg of Hydralazine at this time, see carlyn REEVES to bedside to discuss POC and possible IOL.  1745: BP still elevated, see mar.  1832: Dr. Yi updated on pt BP.  1900: Report given to JUNG Vides RN. POC discussed.

## 2018-08-23 NOTE — CARE PLAN
Problem: Risk for Infection, Impaired Wound Healing  Goal: Remain free from signs and symptoms of infection  Outcome: PROGRESSING AS EXPECTED  Pt free from signs/symptoms of infection.    Problem: Nutrition Deficit  Goal: Patient will verbalize understanding of individual dietary needs  Outcome: PROGRESSING AS EXPECTED  Pt requesting to have diet changed to low sodium, see changes.

## 2018-08-23 NOTE — PROGRESS NOTES
S: No complaints.  O: Afebrile       BP: 156/86 mmHg       No change in physical status       24 hour urine: 2580 mg/day      Results for SUNDAY ANTHONY (MRN [id number]) as of 8/22/2018 21:19   Ref. Range 8/22/2018 01:09   Sodium Latest Ref Range: 135 - 145 mmol/L 132 (L)   Potassium Latest Ref Range: 3.6 - 5.5 mmol/L 4.8   Chloride Latest Ref Range: 96 - 112 mmol/L 99   Co2 Latest Ref Range: 20 - 33 mmol/L 21   Anion Gap Latest Ref Range: 0.0 - 11.9  12.0 (H)   Glucose Latest Ref Range: 65 - 99 mg/dL 134 (H)   Bun Latest Ref Range: 8 - 22 mg/dL 15   Creatinine Latest Ref Range: 0.50 - 1.40 mg/dL 0.78   GFR If  Latest Ref Range: >60 mL/min/1.73 m 2 >60   GFR If Non  Latest Ref Range: >60 mL/min/1.73 m 2 >60   Calcium Latest Ref Range: 8.5 - 10.5 mg/dL 7.1 (L)   AST(SGOT) Latest Ref Range: 12 - 45 U/L 23   ALT(SGPT) Latest Ref Range: 2 - 50 U/L 17   Alkaline Phosphatase Latest Ref Range: 30 - 99 U/L 123 (H)   Total Bilirubin Latest Ref Range: 0.1 - 1.5 mg/dL 0.2   Albumin Latest Ref Range: 3.2 - 4.9 g/dL 3.0 (L)   Total Protein Latest Ref Range: 6.0 - 8.2 g/dL 5.7 (L)   Globulin Latest Ref Range: 1.9 - 3.5 g/dL 2.7   A-G Ratio Latest Units: g/dL 1.1   Results for SUNDAY ANTHONY (MRN [id number]) as of 8/22/2018 21:19   Ref. Range 8/22/2018 01:09   Platelet Count Latest Ref Range: 164 - 446 K/uL 172   A: IUP 33 2/7 weeks   PReeclampsia, labile BP  P:  Continue with laboratory daily        EFM: NST q xhift        Increase labetalol to 200 mg TID  Time: 15 minutes

## 2018-08-24 LAB
CREAT UR-MCNC: 151.1 MG/DL
CRYSTALS AMN MICRO: NORMAL
HOLDING TUBE BB 8507: NORMAL
PROT UR-MCNC: 315.2 MG/DL (ref 0–15)
PROT/CREAT UR: 2086 MG/G (ref 10–107)

## 2018-08-24 PROCEDURE — A9270 NON-COVERED ITEM OR SERVICE: HCPCS | Performed by: OBSTETRICS & GYNECOLOGY

## 2018-08-24 PROCEDURE — 89060 EXAM SYNOVIAL FLUID CRYSTALS: CPT

## 2018-08-24 PROCEDURE — 770002 HCHG ROOM/CARE - OB PRIVATE (112)

## 2018-08-24 PROCEDURE — 700102 HCHG RX REV CODE 250 W/ 637 OVERRIDE(OP): Performed by: OBSTETRICS & GYNECOLOGY

## 2018-08-24 PROCEDURE — 700105 HCHG RX REV CODE 258

## 2018-08-24 PROCEDURE — 36415 COLL VENOUS BLD VENIPUNCTURE: CPT

## 2018-08-24 PROCEDURE — 303615 HCHG EPIDURAL/SPINAL ANESTHESIA FOR LABOR

## 2018-08-24 PROCEDURE — 700105 HCHG RX REV CODE 258: Performed by: OBSTETRICS & GYNECOLOGY

## 2018-08-24 PROCEDURE — 700111 HCHG RX REV CODE 636 W/ 250 OVERRIDE (IP): Performed by: OBSTETRICS & GYNECOLOGY

## 2018-08-24 PROCEDURE — 700101 HCHG RX REV CODE 250: Performed by: OBSTETRICS & GYNECOLOGY

## 2018-08-24 PROCEDURE — 700111 HCHG RX REV CODE 636 W/ 250 OVERRIDE (IP)

## 2018-08-24 PROCEDURE — 304965 HCHG RECOVERY SERVICES

## 2018-08-24 PROCEDURE — 0KQM0ZZ REPAIR PERINEUM MUSCLE, OPEN APPROACH: ICD-10-PCS | Performed by: OBSTETRICS & GYNECOLOGY

## 2018-08-24 PROCEDURE — 59409 OBSTETRICAL CARE: CPT

## 2018-08-24 RX ORDER — ONDANSETRON 2 MG/ML
4 INJECTION INTRAMUSCULAR; INTRAVENOUS EVERY 6 HOURS PRN
Status: DISCONTINUED | OUTPATIENT
Start: 2018-08-24 | End: 2018-08-25

## 2018-08-24 RX ORDER — ROPIVACAINE HYDROCHLORIDE 2 MG/ML
INJECTION, SOLUTION EPIDURAL; INFILTRATION; PERINEURAL
Status: COMPLETED
Start: 2018-08-24 | End: 2018-08-24

## 2018-08-24 RX ORDER — SODIUM CHLORIDE, SODIUM LACTATE, POTASSIUM CHLORIDE, CALCIUM CHLORIDE 600; 310; 30; 20 MG/100ML; MG/100ML; MG/100ML; MG/100ML
INJECTION, SOLUTION INTRAVENOUS
Status: ACTIVE
Start: 2018-08-24 | End: 2018-08-24

## 2018-08-24 RX ORDER — MAGNESIUM SULFATE HEPTAHYDRATE 40 MG/ML
INJECTION, SOLUTION INTRAVENOUS
Status: COMPLETED
Start: 2018-08-24 | End: 2018-08-25

## 2018-08-24 RX ORDER — SODIUM CHLORIDE, SODIUM LACTATE, POTASSIUM CHLORIDE, CALCIUM CHLORIDE 600; 310; 30; 20 MG/100ML; MG/100ML; MG/100ML; MG/100ML
INJECTION, SOLUTION INTRAVENOUS
Status: ACTIVE
Start: 2018-08-24 | End: 2018-08-25

## 2018-08-24 RX ORDER — SODIUM CHLORIDE, SODIUM LACTATE, POTASSIUM CHLORIDE, CALCIUM CHLORIDE 600; 310; 30; 20 MG/100ML; MG/100ML; MG/100ML; MG/100ML
INJECTION, SOLUTION INTRAVENOUS
Status: COMPLETED
Start: 2018-08-24 | End: 2018-08-25

## 2018-08-24 RX ORDER — LIDOCAINE HYDROCHLORIDE 10 MG/ML
INJECTION, SOLUTION EPIDURAL; INFILTRATION; INTRACAUDAL; PERINEURAL
Status: COMPLETED
Start: 2018-08-24 | End: 2018-08-24

## 2018-08-24 RX ORDER — SODIUM CHLORIDE 9 MG/ML
INJECTION, SOLUTION INTRAVENOUS
Status: COMPLETED
Start: 2018-08-24 | End: 2018-08-25

## 2018-08-24 RX ORDER — ONDANSETRON 4 MG/1
4 TABLET, ORALLY DISINTEGRATING ORAL EVERY 6 HOURS PRN
Status: DISCONTINUED | OUTPATIENT
Start: 2018-08-24 | End: 2018-08-25

## 2018-08-24 RX ADMIN — SODIUM CHLORIDE 2.5 MILLION UNITS: 9 INJECTION, SOLUTION INTRAVENOUS at 20:15

## 2018-08-24 RX ADMIN — Medication 2000 ML/HR: at 22:05

## 2018-08-24 RX ADMIN — SODIUM CHLORIDE 2.5 MILLION UNITS: 9 INJECTION, SOLUTION INTRAVENOUS at 08:26

## 2018-08-24 RX ADMIN — LABETALOL HYDROCHLORIDE 20 MG: 5 INJECTION, SOLUTION INTRAVENOUS at 14:57

## 2018-08-24 RX ADMIN — SODIUM CHLORIDE 2.5 MILLION UNITS: 9 INJECTION, SOLUTION INTRAVENOUS at 16:20

## 2018-08-24 RX ADMIN — FENTANYL CITRATE 50 MCG: 50 INJECTION INTRAMUSCULAR; INTRAVENOUS at 00:23

## 2018-08-24 RX ADMIN — HYDRALAZINE HYDROCHLORIDE 10 MG: 20 INJECTION INTRAMUSCULAR; INTRAVENOUS at 01:27

## 2018-08-24 RX ADMIN — Medication 125 ML/HR: at 23:45

## 2018-08-24 RX ADMIN — SODIUM CHLORIDE, POTASSIUM CHLORIDE, SODIUM LACTATE AND CALCIUM CHLORIDE 1000 ML: 600; 310; 30; 20 INJECTION, SOLUTION INTRAVENOUS at 19:25

## 2018-08-24 RX ADMIN — SODIUM CHLORIDE, SODIUM LACTATE, POTASSIUM CHLORIDE, CALCIUM CHLORIDE AND DEXTROSE MONOHYDRATE: 5; 600; 310; 30; 20 INJECTION, SOLUTION INTRAVENOUS at 09:42

## 2018-08-24 RX ADMIN — MAGNESIUM SULFATE IN WATER 4 G: 40 INJECTION, SOLUTION INTRAVENOUS at 22:56

## 2018-08-24 RX ADMIN — SODIUM CHLORIDE 5 MILLION UNITS: 900 INJECTION INTRAVENOUS at 00:36

## 2018-08-24 RX ADMIN — MAGNESIUM SULFATE IN WATER 20 G: 40 INJECTION, SOLUTION INTRAVENOUS at 23:23

## 2018-08-24 RX ADMIN — LABETALOL HYDROCHLORIDE 200 MG: 100 TABLET, FILM COATED ORAL at 12:18

## 2018-08-24 RX ADMIN — SODIUM CHLORIDE, SODIUM LACTATE, POTASSIUM CHLORIDE, CALCIUM CHLORIDE AND DEXTROSE MONOHYDRATE: 5; 600; 310; 30; 20 INJECTION, SOLUTION INTRAVENOUS at 19:44

## 2018-08-24 RX ADMIN — LABETALOL HYDROCHLORIDE 40 MG: 5 INJECTION, SOLUTION INTRAVENOUS at 18:40

## 2018-08-24 RX ADMIN — SODIUM CHLORIDE 1000 ML: 9 INJECTION, SOLUTION INTRAVENOUS at 17:50

## 2018-08-24 RX ADMIN — SODIUM CHLORIDE 2.5 MILLION UNITS: 9 INJECTION, SOLUTION INTRAVENOUS at 12:19

## 2018-08-24 RX ADMIN — Medication 2 MILLI-UNITS/MIN: at 09:42

## 2018-08-24 RX ADMIN — LIDOCAINE HYDROCHLORIDE 30 ML: 10 INJECTION, SOLUTION EPIDURAL; INFILTRATION; INTRACAUDAL; PERINEURAL at 22:08

## 2018-08-24 RX ADMIN — HYDRALAZINE HYDROCHLORIDE 5 MG: 20 INJECTION INTRAMUSCULAR; INTRAVENOUS at 09:59

## 2018-08-24 RX ADMIN — LABETALOL HYDROCHLORIDE 200 MG: 100 TABLET, FILM COATED ORAL at 04:14

## 2018-08-24 RX ADMIN — HYDRALAZINE HYDROCHLORIDE 10 MG: 20 INJECTION INTRAMUSCULAR; INTRAVENOUS at 11:35

## 2018-08-24 RX ADMIN — ROPIVACAINE HYDROCHLORIDE 100 ML: 2 INJECTION, SOLUTION EPIDURAL; INFILTRATION at 19:13

## 2018-08-24 RX ADMIN — LABETALOL HYDROCHLORIDE 200 MG: 100 TABLET, FILM COATED ORAL at 21:00

## 2018-08-24 RX ADMIN — SODIUM CHLORIDE 2.5 MILLION UNITS: 9 INJECTION, SOLUTION INTRAVENOUS at 04:16

## 2018-08-24 ASSESSMENT — PAIN SCALES - GENERAL
PAINLEVEL_OUTOF10: 0

## 2018-08-24 NOTE — PROGRESS NOTES
0700  Report from NOC RN in room with pt at this time.  Pt resting and RN to return later for assessment. 0730  Pt awakened and position change at this time.  HT's adjusted and pt has no new complaints at this time.  0830  Dr. Yi in room with pt and POC discussed, continue POC to remove balloon at 09ish and then start pitocin for increased UC activity. 0930  Cooks balloon removed at this time and SVE.  Cervical dilation noted at this time.  Pt has no report of UC's or cramping.  0945  IV pitocin started at this time.  1000  IV hydralazine given for BP, BP to follow with read.  1135  IV hydralazine given again for BP.  Mild decrease with following reading.  1215  PO Labetolol given per MD order, pt continues to have report of mild cramping.  1445  BP reading that needed intervention.  Awilda Yi phoned and orders received.  1457  IV labetolol given per MD order at this time.  1500  Dr. Yi phoned and orders received at this time.  Discussion of possible rest period throughout the night and then to restart her IOL tomorrow morning.  POC discussed with pt and for not continue with current interventions in place.  1545  SVE with mild cervical change made, mild effacement.  Pt having UC's every 2-3min, traced when in supine position.  1600  Position change to WR from  following variables on the tracing.  1630  IV pitocin turned off, position change at this time and pt up to BR.  Voided and in BR, pt had a gush of fluid that dripped onto the floor, fern obtained from floor fluid and fern positive.  1645  L. Oki in room and POC discussed.  1740  L. Oki in room and POC discussed, orders for IUPC and to restart IV pitocin at this time and start an amnioinfusion.  1750  IUPC placed and minor cervical change made at this time.  Pt breathing through UC's and does not desire any pain medication at this time.  1840  IV Labetolol given for increased BP.  Pt encouraged to consider the epidural in an effort to decrease her BP.   1850  Pt continues to breath with UC's, Pt up for epidural at this time.  Dr. Danielle in room and consents completed.  Report to NOC RN in room with pt.

## 2018-08-24 NOTE — PROGRESS NOTES
2015- Assumed care of pt, SBAR received. POC discussed. VSS. Dr. Yi notified of recent BP's. Pt to move to room 212 once available to start calderon bulb induction.     2108- Pt moved to room 2108, POC discussed. Dr. Yi made aware of new room. En route to see pt to discuss induction. Family at bedside. EFM and TOCO applied. Pt oriented to new room.     2128- Dr. Yi at bedside. Reviewed labwork. SVE FT/ thick/ high. POC discussed with pt regarding induction of labor. Pt and FOB verbalized understanding. Pt up to void x1. Medicated with Tylenol po for headache.     2135- Dr. Yi at bedside. Placed Cooks catheter. Pt tolerated procedure well. FOB at bedside. Neonatologist to see pt and discussed plan of care once baby is born.     0023- Pt states she is uncomfortable. 4/10 pain. Medicated with Fentanyl 50 mcg, repositioned for comfort. Instructed to call for assistance. FOB at bedside. Pt denies headache at this time. States it subsided after Tylenol po.     0125- BP elevated. Dr. Yi notified of BP's. Orders to give Hydralyzine 10mg IVP now x1. POC discussed with pt. Pt verbalized understanding. IV site removed to left forearm.  New IV site placed to right wrist. No signs of infiltration noted.     0510- SBAR endorsed to Margi AVUGHAN.   0615- Dr. Yi updated on pt status. Made aware of recent BP. No new orders received.   0700- SBAR endorsed to Sakina VAUGHAN.

## 2018-08-24 NOTE — CARE PLAN
Problem: Pain  Goal: Patient will have relaxed facial expressions and be able to rest between uterine contractions  Outcome: MET Date Met: 08/24/18  Pt not having pain at this time and will notify RN when she is hurting.

## 2018-08-24 NOTE — PROGRESS NOTES
S: Called by RN regarding increased BP.  O:  Afebrile     BP: 192/100, receiving hydralazine protocol     Edema: 2+     Platelets: 166K      CMP BUN 16  A:  IUP 33 3/7 weeks       Preeclampsia, now requiring IV antihypertensive therapy  P:  Will assess cervix and determine mode of delivery.    Time:  15 minutes

## 2018-08-24 NOTE — PROGRESS NOTES
1900: report received from Marion VAUGHAN.  1906: Pt BP elevated 164/93  1940: pt BP elevated 188/105  1945 Dr. Yi called regarding pt status orders received for labs (see order) and hydralazine ( MAR)  2001: Hydralazine given  2015: report given to Diane VAUGHAN

## 2018-08-24 NOTE — PROGRESS NOTES
Cervix fingertip dilated.  [employer] cervical ripening balloon placed without difficulty.  60 ml intrauterine and 40 vaginal.      Pt is complaining of headache.  Will provide tylenol.  If no relief, may need to be on magnesium sulfate.  CMP and CBC stable.

## 2018-08-25 LAB
ABO GROUP BLD: NORMAL
ALBUMIN SERPL BCP-MCNC: 2.6 G/DL (ref 3.2–4.9)
ALBUMIN/GLOB SERPL: 1 G/DL
ALP SERPL-CCNC: 113 U/L (ref 30–99)
ALT SERPL-CCNC: 25 U/L (ref 2–50)
ANION GAP SERPL CALC-SCNC: 8 MMOL/L (ref 0–11.9)
AST SERPL-CCNC: 27 U/L (ref 12–45)
BASOPHILS # BLD AUTO: 0.3 % (ref 0–1.8)
BASOPHILS # BLD: 0.05 K/UL (ref 0–0.12)
BILIRUB SERPL-MCNC: 0.4 MG/DL (ref 0.1–1.5)
BLD GP AB SCN SERPL QL: NORMAL
BUN SERPL-MCNC: 13 MG/DL (ref 8–22)
CALCIUM SERPL-MCNC: 7.9 MG/DL (ref 8.5–10.5)
CHLORIDE SERPL-SCNC: 103 MMOL/L (ref 96–112)
CO2 SERPL-SCNC: 20 MMOL/L (ref 20–33)
CREAT SERPL-MCNC: 0.84 MG/DL (ref 0.5–1.4)
EOSINOPHIL # BLD AUTO: 0.02 K/UL (ref 0–0.51)
EOSINOPHIL NFR BLD: 0.1 % (ref 0–6.9)
ERYTHROCYTE [DISTWIDTH] IN BLOOD BY AUTOMATED COUNT: 45.2 FL (ref 35.9–50)
GLOBULIN SER CALC-MCNC: 2.5 G/DL (ref 1.9–3.5)
GLUCOSE SERPL-MCNC: 83 MG/DL (ref 65–99)
HBV SURFACE AG SER QL: NEGATIVE
HCT VFR BLD AUTO: 39.1 % (ref 37–47)
HCV AB SER QL: NEGATIVE
HGB BLD-MCNC: 13.2 G/DL (ref 12–16)
HIV 1+2 AB+HIV1 P24 AG SERPL QL IA: NON REACTIVE
IMM GRANULOCYTES # BLD AUTO: 0.16 K/UL (ref 0–0.11)
IMM GRANULOCYTES NFR BLD AUTO: 0.9 % (ref 0–0.9)
LYMPHOCYTES # BLD AUTO: 2.45 K/UL (ref 1–4.8)
LYMPHOCYTES NFR BLD: 13.3 % (ref 22–41)
MAGNESIUM SERPL-MCNC: 5.1 MG/DL (ref 1.5–2.5)
MCH RBC QN AUTO: 32 PG (ref 27–33)
MCHC RBC AUTO-ENTMCNC: 33.8 G/DL (ref 33.6–35)
MCV RBC AUTO: 94.9 FL (ref 81.4–97.8)
MONOCYTES # BLD AUTO: 0.98 K/UL (ref 0–0.85)
MONOCYTES NFR BLD AUTO: 5.3 % (ref 0–13.4)
NEUTROPHILS # BLD AUTO: 14.81 K/UL (ref 2–7.15)
NEUTROPHILS NFR BLD: 80.1 % (ref 44–72)
NRBC # BLD AUTO: 0 K/UL
NRBC BLD-RTO: 0 /100 WBC
PLATELET # BLD AUTO: 141 K/UL (ref 164–446)
PMV BLD AUTO: 11.8 FL (ref 9–12.9)
POTASSIUM SERPL-SCNC: 4.5 MMOL/L (ref 3.6–5.5)
PROT SERPL-MCNC: 5.1 G/DL (ref 6–8.2)
RBC # BLD AUTO: 4.12 M/UL (ref 4.2–5.4)
RH BLD: NORMAL
RUBV AB SER QL: 9.4 IU/ML
SODIUM SERPL-SCNC: 131 MMOL/L (ref 135–145)
TREPONEMA PALLIDUM IGG+IGM AB [PRESENCE] IN SERUM OR PLASMA BY IMMUNOASSAY: NON REACTIVE
WBC # BLD AUTO: 18.5 K/UL (ref 4.8–10.8)

## 2018-08-25 PROCEDURE — 700111 HCHG RX REV CODE 636 W/ 250 OVERRIDE (IP)

## 2018-08-25 PROCEDURE — 83735 ASSAY OF MAGNESIUM: CPT

## 2018-08-25 PROCEDURE — 88307 TISSUE EXAM BY PATHOLOGIST: CPT

## 2018-08-25 PROCEDURE — A9270 NON-COVERED ITEM OR SERVICE: HCPCS | Performed by: ADVANCED PRACTICE MIDWIFE

## 2018-08-25 PROCEDURE — 80053 COMPREHEN METABOLIC PANEL: CPT

## 2018-08-25 PROCEDURE — 85025 COMPLETE CBC W/AUTO DIFF WBC: CPT

## 2018-08-25 PROCEDURE — 700105 HCHG RX REV CODE 258

## 2018-08-25 PROCEDURE — 86850 RBC ANTIBODY SCREEN: CPT

## 2018-08-25 PROCEDURE — 700105 HCHG RX REV CODE 258: Performed by: OBSTETRICS & GYNECOLOGY

## 2018-08-25 PROCEDURE — 770002 HCHG ROOM/CARE - OB PRIVATE (112)

## 2018-08-25 PROCEDURE — 87340 HEPATITIS B SURFACE AG IA: CPT

## 2018-08-25 PROCEDURE — A9270 NON-COVERED ITEM OR SERVICE: HCPCS | Performed by: OBSTETRICS & GYNECOLOGY

## 2018-08-25 PROCEDURE — 700111 HCHG RX REV CODE 636 W/ 250 OVERRIDE (IP): Performed by: OBSTETRICS & GYNECOLOGY

## 2018-08-25 PROCEDURE — 86762 RUBELLA ANTIBODY: CPT

## 2018-08-25 PROCEDURE — 86900 BLOOD TYPING SEROLOGIC ABO: CPT

## 2018-08-25 PROCEDURE — 86901 BLOOD TYPING SEROLOGIC RH(D): CPT

## 2018-08-25 PROCEDURE — 36415 COLL VENOUS BLD VENIPUNCTURE: CPT

## 2018-08-25 PROCEDURE — 86803 HEPATITIS C AB TEST: CPT

## 2018-08-25 PROCEDURE — 86780 TREPONEMA PALLIDUM: CPT

## 2018-08-25 PROCEDURE — 87389 HIV-1 AG W/HIV-1&-2 AB AG IA: CPT

## 2018-08-25 PROCEDURE — 700102 HCHG RX REV CODE 250 W/ 637 OVERRIDE(OP): Performed by: OBSTETRICS & GYNECOLOGY

## 2018-08-25 PROCEDURE — 700102 HCHG RX REV CODE 250 W/ 637 OVERRIDE(OP): Performed by: ADVANCED PRACTICE MIDWIFE

## 2018-08-25 PROCEDURE — 700101 HCHG RX REV CODE 250: Performed by: OBSTETRICS & GYNECOLOGY

## 2018-08-25 RX ORDER — LABETALOL 100 MG/1
300 TABLET, FILM COATED ORAL EVERY 8 HOURS
Status: DISCONTINUED | OUTPATIENT
Start: 2018-08-25 | End: 2018-08-27 | Stop reason: HOSPADM

## 2018-08-25 RX ORDER — ACETAMINOPHEN 325 MG/1
325 TABLET ORAL EVERY 4 HOURS PRN
Status: DISCONTINUED | OUTPATIENT
Start: 2018-08-25 | End: 2018-08-27 | Stop reason: HOSPADM

## 2018-08-25 RX ORDER — SODIUM CHLORIDE, SODIUM LACTATE, POTASSIUM CHLORIDE, CALCIUM CHLORIDE 600; 310; 30; 20 MG/100ML; MG/100ML; MG/100ML; MG/100ML
INJECTION, SOLUTION INTRAVENOUS CONTINUOUS
Status: DISCONTINUED | OUTPATIENT
Start: 2018-08-25 | End: 2018-08-26

## 2018-08-25 RX ORDER — HYDROCODONE BITARTRATE AND ACETAMINOPHEN 5; 325 MG/1; MG/1
2 TABLET ORAL EVERY 4 HOURS PRN
Status: DISCONTINUED | OUTPATIENT
Start: 2018-08-25 | End: 2018-08-27 | Stop reason: HOSPADM

## 2018-08-25 RX ORDER — VITAMIN A ACETATE, BETA CAROTENE, ASCORBIC ACID, CHOLECALCIFEROL, .ALPHA.-TOCOPHEROL ACETATE, DL-, THIAMINE MONONITRATE, RIBOFLAVIN, NIACINAMIDE, PYRIDOXINE HYDROCHLORIDE, FOLIC ACID, CYANOCOBALAMIN, CALCIUM CARBONATE, FERROUS FUMARATE, ZINC OXIDE, CUPRIC OXIDE 3080; 12; 120; 400; 1; 1.84; 3; 20; 22; 920; 25; 200; 27; 10; 2 [IU]/1; UG/1; MG/1; [IU]/1; MG/1; MG/1; MG/1; MG/1; MG/1; [IU]/1; MG/1; MG/1; MG/1; MG/1; MG/1
1 TABLET, FILM COATED ORAL EVERY MORNING
Status: DISCONTINUED | OUTPATIENT
Start: 2018-08-26 | End: 2018-08-27 | Stop reason: HOSPADM

## 2018-08-25 RX ORDER — MAGNESIUM SULFATE HEPTAHYDRATE 40 MG/ML
INJECTION, SOLUTION INTRAVENOUS
Status: COMPLETED
Start: 2018-08-25 | End: 2018-08-25

## 2018-08-25 RX ORDER — NIFEDIPINE 10 MG/1
10 CAPSULE ORAL ONCE
Status: COMPLETED | OUTPATIENT
Start: 2018-08-25 | End: 2018-08-25

## 2018-08-25 RX ORDER — SODIUM CHLORIDE, SODIUM LACTATE, POTASSIUM CHLORIDE, CALCIUM CHLORIDE 600; 310; 30; 20 MG/100ML; MG/100ML; MG/100ML; MG/100ML
INJECTION, SOLUTION INTRAVENOUS
Status: COMPLETED
Start: 2018-08-25 | End: 2018-08-25

## 2018-08-25 RX ORDER — MAGNESIUM SULFATE HEPTAHYDRATE 40 MG/ML
1.5 INJECTION, SOLUTION INTRAVENOUS CONTINUOUS
Status: DISCONTINUED | OUTPATIENT
Start: 2018-08-25 | End: 2018-08-26

## 2018-08-25 RX ORDER — NIFEDIPINE 30 MG/1
30 TABLET, EXTENDED RELEASE ORAL DAILY
Status: DISCONTINUED | OUTPATIENT
Start: 2018-08-25 | End: 2018-08-26

## 2018-08-25 RX ADMIN — NIFEDIPINE 30 MG: 30 TABLET, FILM COATED, EXTENDED RELEASE ORAL at 16:47

## 2018-08-25 RX ADMIN — MAGNESIUM SULFATE IN WATER 1.5 G/HR: 40 INJECTION, SOLUTION INTRAVENOUS at 20:51

## 2018-08-25 RX ADMIN — ACETAMINOPHEN 650 MG: 325 TABLET, FILM COATED ORAL at 02:12

## 2018-08-25 RX ADMIN — SODIUM CHLORIDE, POTASSIUM CHLORIDE, SODIUM LACTATE AND CALCIUM CHLORIDE: 600; 310; 30; 20 INJECTION, SOLUTION INTRAVENOUS at 20:51

## 2018-08-25 RX ADMIN — ACETAMINOPHEN 650 MG: 325 TABLET, FILM COATED ORAL at 21:34

## 2018-08-25 RX ADMIN — LABETALOL HCL 300 MG: 300 TABLET, FILM COATED ORAL at 02:13

## 2018-08-25 RX ADMIN — NIFEDIPINE 10 MG: 10 CAPSULE, LIQUID FILLED ORAL at 18:16

## 2018-08-25 RX ADMIN — MAGNESIUM SULFATE IN WATER 20 G: 40 INJECTION, SOLUTION INTRAVENOUS at 09:30

## 2018-08-25 RX ADMIN — LABETALOL HYDROCHLORIDE 20 MG: 5 INJECTION, SOLUTION INTRAVENOUS at 16:33

## 2018-08-25 RX ADMIN — LABETALOL HCL 300 MG: 300 TABLET, FILM COATED ORAL at 10:14

## 2018-08-25 RX ADMIN — SODIUM CHLORIDE, POTASSIUM CHLORIDE, SODIUM LACTATE AND CALCIUM CHLORIDE 1000 ML: 600; 310; 30; 20 INJECTION, SOLUTION INTRAVENOUS at 10:15

## 2018-08-25 RX ADMIN — ACETAMINOPHEN 650 MG: 325 TABLET, FILM COATED ORAL at 10:14

## 2018-08-25 RX ADMIN — LABETALOL HCL 300 MG: 300 TABLET, FILM COATED ORAL at 18:30

## 2018-08-25 ASSESSMENT — PAIN SCALES - GENERAL
PAINLEVEL_OUTOF10: 0
PAINLEVEL_OUTOF10: 3
PAINLEVEL_OUTOF10: 0
PAINLEVEL_OUTOF10: 0
PAINLEVEL_OUTOF10: 5
PAINLEVEL_OUTOF10: ASSUMED PAIN PRESENT
PAINLEVEL_OUTOF10: 0

## 2018-08-25 ASSESSMENT — PATIENT HEALTH QUESTIONNAIRE - PHQ9
SUM OF ALL RESPONSES TO PHQ9 QUESTIONS 1 AND 2: 0
1. LITTLE INTEREST OR PLEASURE IN DOING THINGS: NOT AT ALL
SUM OF ALL RESPONSES TO PHQ9 QUESTIONS 1 AND 2: 0
1. LITTLE INTEREST OR PLEASURE IN DOING THINGS: NOT AT ALL
2. FEELING DOWN, DEPRESSED, IRRITABLE, OR HOPELESS: NOT AT ALL
2. FEELING DOWN, DEPRESSED, IRRITABLE, OR HOPELESS: NOT AT ALL

## 2018-08-25 NOTE — CARE PLAN
Problem: Altered physiologic condition related to immediate post-delivery state and potential for bleeding/hemorrhage  Goal: Patient physiologically stable as evidenced by normal lochia, palpable uterine involution and vital signs within normal limits  Outcome: MET Date Met: 08/25/18  Pt has FF with light lochia, no further intervention needed at this time.

## 2018-08-25 NOTE — PROGRESS NOTES
Postpartum Day #1 S/P , IOL for severe Pre-E @ 33 4/7    S: Feels tired on the magnesium, but doing well overall.        No H/A        Baby in NICU~doing well       Pumping q 3 hours        Mild pain    O: BP labile       Labs improved but PLT 180K to 141K, on Magnesium sulfate        Afebrile        Bleeding scant       Perineum approximated/no swelling    A: Postpartum Day 1 S/P , IOL for Pre-E      Stable      BP labile on magnesium sulfate post delivery      Pumping~Baby doing well in NICU      Afebrile        P: Tx to postpartum        Decrease Magnesium to 1.5 G and then D/C at 23:00 tonight       Continue pumping       Start Nifedipine XL 30mg po daily        Anticipate D/C in 1-2 days if BP stable        Discussed with Dr. Yi and agrees with plan    Total time: 20 min

## 2018-08-25 NOTE — CARE PLAN
Problem: Alteration in comfort related to episiotomy, vaginal repair and/or after birth pains  Goal: Patient verbalizes acceptable pain level  Outcome: MET Date Met: 08/25/18  Pt does not need any pain medications at this time, will notify RN if this changes.  Pt would like to sleep.

## 2018-08-25 NOTE — DELIVERY NOTE
Patient was found to be complete @ 2130. She was moved to the OR for delivery. At 2203, she pushed effectively and delivered a viable male infant weighing 2170 grams. The fetal head delivered easily in the JAMES position. There was a tight nuchal cord x 1 that was delivered through. The infant was crying and vigorous upon delivery and delayed cord clamping x 30 seconds was allowed. The cord was then clamped and cut. Pitocin was initiated immediately upon delivery of the infant. The placenta delivered spontaneously within 10 min via [place] and was found to be intact. 3VC noted. The fundus was firm with massage. Upon inspection of the cervix, vaginal mucosa, labia, and perineum, she was found to have a second degree perineal laceration that was repaired with a 3-0 Vicryl suture and was hemostatic. She was given 1% lidocaine injection prior to the repair. Apgars on the infant were 8 and 9. The infant was transferred to the NICU. The patient left the OR in stable condition. Magnesium sulfate therapy will be initiated in recovery due to elevated blood pressures. Dr. Yi was present for the delivery and repair.     Total floor time: 90 minutes

## 2018-08-25 NOTE — PROGRESS NOTES
0- Assumed care of pt, SBAR received. POC discussed with pt. Epidural in progress.   - calderon placed using asceptic technique.   - SUNG JONES at bedside. SVE performed. 4-5/75/0. Pericare provided. Repositioned to left side with wedge placed and peanut ball.     - Late decels noted to fetal heart tracing. Pt turned to right side.O2 10 liters via mask applied. Pitocin turned off.  SUNG JONES reviewed fetal heart tracing.     - Dr. Yi in unit, notified of late decels and measures taken to fix. Reviewed fetal heart tracing.     -SUNG Yi at bedside, discussed POC with pt and FOB. Reviewed fetal heart tracing.   - Pitocin restarted at 2mu/6ml. Fetal heart tracing reactive.   - Pt states she feels pressure. SVE 10/100/+2. 21.5  - Pt to OR, Dr. Yi and SUNG Yi in unit. NICU and RT notified.   - , viable boy. Apgars 8/9. Second degree laceration noted to perineum. Repair done.  Infant to chest during delayed cord clamping. Fundus firm, lochia scant. Pitocin bolus started per SUNG Yi order.    - Placenta delivered intact.   - Pt back to room.  - Dr. Yi notified of elevated BP post delivery. Orders received to start Mg drip.   225- Mg 4GM bolus infusing. Pt tolerating well. Asymptomatic.   - Epidural catheter removed with tip intact. pericare provided. Fundus remain firm, Lochia scant.   3- Dr. Yi notified of BP. Orders pt to stay on labor and delivery until am and increase labetolol to 300mg q8h.  220- Pericare provided. Fundus firm, lochia scant. Pt medicated with Tylenol for perineal discomfort and Labetolol 300mg per orders. BP retaken on right arm. Denies headache. Mg drip infusing.   630- Dr. Yi updated on pt status, most recent vital signs. Pt to stay in labor and delivery until he visits this am. Pt continues to deny headache. Labs sent. U.O. Adequate. Fundus firm, lochia scant. Pericare provided, tucks and spray applied.  0708- SBAR endorsed to Sakina  CLEMENT.

## 2018-08-25 NOTE — PROGRESS NOTES
0700  Report from NOC RN outside of room, pt resting at this time and RN to return later for assessment.  0830  RN into room and pt is done eating at this time.  Pt instructed on pumping and performed with pt at this time.  Pt very tired and would like to rest. 1200  Pt up at BS and pads changed at this time.  Pt slightly woozy standing up. Pads changed and pt does not need any pain medications at this time.  1400  Pt has no new complaints at this time, continues to report a mild HA that will not go away.  Pt resting throughout the day.  1600  SUNG Yi in room and orders for possible transfer to , BP taken and Mag reduced at this time.  Reading very high and interventions needed.  Pt transferred to room 230 and BP continues to be high following interventions.  1730  Report to Dr. Yi and orders received at this time.  1800  POC discussed with pt and medications given for BP.  1850  Report to Mona VAUGHAN in room with pt at this time.

## 2018-08-26 LAB
ANION GAP SERPL CALC-SCNC: 8 MMOL/L (ref 0–11.9)
BUN SERPL-MCNC: 10 MG/DL (ref 8–22)
CALCIUM SERPL-MCNC: 7.7 MG/DL (ref 8.5–10.5)
CHLORIDE SERPL-SCNC: 100 MMOL/L (ref 96–112)
CO2 SERPL-SCNC: 26 MMOL/L (ref 20–33)
CREAT SERPL-MCNC: 0.82 MG/DL (ref 0.5–1.4)
ERYTHROCYTE [DISTWIDTH] IN BLOOD BY AUTOMATED COUNT: 45.3 FL (ref 35.9–50)
GLUCOSE SERPL-MCNC: 87 MG/DL (ref 65–99)
HCT VFR BLD AUTO: 39.4 % (ref 37–47)
HGB BLD-MCNC: 13.2 G/DL (ref 12–16)
MCH RBC QN AUTO: 31.6 PG (ref 27–33)
MCHC RBC AUTO-ENTMCNC: 33.5 G/DL (ref 33.6–35)
MCV RBC AUTO: 94.3 FL (ref 81.4–97.8)
PLATELET # BLD AUTO: 157 K/UL (ref 164–446)
PMV BLD AUTO: 12 FL (ref 9–12.9)
POTASSIUM SERPL-SCNC: 4.2 MMOL/L (ref 3.6–5.5)
RBC # BLD AUTO: 4.18 M/UL (ref 4.2–5.4)
SODIUM SERPL-SCNC: 134 MMOL/L (ref 135–145)
WBC # BLD AUTO: 19 K/UL (ref 4.8–10.8)

## 2018-08-26 PROCEDURE — 80048 BASIC METABOLIC PNL TOTAL CA: CPT

## 2018-08-26 PROCEDURE — 700102 HCHG RX REV CODE 250 W/ 637 OVERRIDE(OP): Performed by: OBSTETRICS & GYNECOLOGY

## 2018-08-26 PROCEDURE — 302135 SEQUENTIAL COMPRESSION MACHINE: Performed by: OBSTETRICS & GYNECOLOGY

## 2018-08-26 PROCEDURE — A9270 NON-COVERED ITEM OR SERVICE: HCPCS | Performed by: ADVANCED PRACTICE MIDWIFE

## 2018-08-26 PROCEDURE — 700102 HCHG RX REV CODE 250 W/ 637 OVERRIDE(OP): Performed by: ADVANCED PRACTICE MIDWIFE

## 2018-08-26 PROCEDURE — 36415 COLL VENOUS BLD VENIPUNCTURE: CPT

## 2018-08-26 PROCEDURE — 770002 HCHG ROOM/CARE - OB PRIVATE (112)

## 2018-08-26 PROCEDURE — A9270 NON-COVERED ITEM OR SERVICE: HCPCS | Performed by: OBSTETRICS & GYNECOLOGY

## 2018-08-26 PROCEDURE — 85027 COMPLETE CBC AUTOMATED: CPT

## 2018-08-26 RX ORDER — NIFEDIPINE 60 MG/1
60 TABLET, EXTENDED RELEASE ORAL DAILY
Status: DISCONTINUED | OUTPATIENT
Start: 2018-08-27 | End: 2018-08-27 | Stop reason: HOSPADM

## 2018-08-26 RX ADMIN — NIFEDIPINE 30 MG: 30 TABLET, FILM COATED, EXTENDED RELEASE ORAL at 06:09

## 2018-08-26 RX ADMIN — LABETALOL HCL 300 MG: 300 TABLET, FILM COATED ORAL at 18:00

## 2018-08-26 RX ADMIN — LABETALOL HCL 300 MG: 300 TABLET, FILM COATED ORAL at 10:28

## 2018-08-26 RX ADMIN — Medication 1 TABLET: at 06:08

## 2018-08-26 RX ADMIN — LABETALOL HCL 300 MG: 300 TABLET, FILM COATED ORAL at 02:01

## 2018-08-26 ASSESSMENT — PAIN SCALES - GENERAL
PAINLEVEL_OUTOF10: 0
PAINLEVEL_OUTOF10: 1
PAINLEVEL_OUTOF10: ASSUMED PAIN PRESENT
PAINLEVEL_OUTOF10: 1

## 2018-08-26 NOTE — PROGRESS NOTES
1850- Report received from LAMONT Nguyen RN. Pt resting in bed. Patient's parents at bedside. POC discussed.     1945- Update to Dr. Yi, in unit, regarding pt c/o constant dull HA, 3 out of 10 on 0-10 pain scale, unrelieved by PRN medication. Order received to continue IV Magnesium and urinary catheter until 0700, pt to stay on L&D overnight & pt able to go to NICU to see infant by  with RN at anytime.    2115- Update to Dr. Yi, in unit, regarding recent BPs. AM lab orders received.     [id number]- Pt to NICU with RN and FOB. Transported by  without incident. Pt c/o dizziness when standing. c/o fatigue at infant bedside and requested to return to L&D.     0600- Pt reports HA improved. 1 out of 10 on 0-10 scale. Pumping currently, FOB at bedside.     0650- Report given to JUNG Armenta RN. POC discussed.    0700- Urinary catheter and IV magnesium discontinued. IV SL.    0710- Dr. Yi at bedside. Order to transfer pt to PPU received.

## 2018-08-26 NOTE — PROGRESS NOTES
0650: Report received from TYREE Mata RN. POC discussed. Pt awake. Reports headache at 1/10. Magnesium Sulfate infusing. Orders received to discontinue. FOB at bedside. Infant in NICU. Possible transfer to PP.  0915: Pt up to bathroom with standby assist, voided moderate amount. Lochia scant.  0925: Pt transferred to  via wheelchair. Report given to CLEMENT Guadarrama. POC discussed.

## 2018-08-26 NOTE — PROGRESS NOTES
Mother has been using breast pump independently for baby in NICU, states she is getting small amount of colostrum when she pumps, denies pain when she pumps, verified understanding of proper pump use and settings, encouraged to set suction to comfort, speed 80/50-60.    Plan:  -Pump Q 2-3 hours for 15 minutes at least 8 times every 24 hours  -leave time for a 5 hour stretch of sleep at night-set alarm if needed    Mother has Medela personal pump for home use, discussed importance of using HG pump when baby is in NICU, HG pump rental information provided.    Educated on proper technique for cleaning pump parts and dish soap provided    Encouraged to call for assistance as needed

## 2018-08-26 NOTE — PROGRESS NOTES
Pt arrived on unit with . Report received from Marion VAUGHAN.  Pt oriented to unit and surroundings. Infant in NICU.  Assessment complete. No additional questions at this time.

## 2018-08-27 VITALS
BODY MASS INDEX: 37.08 KG/M2 | OXYGEN SATURATION: 96 % | HEART RATE: 76 BPM | DIASTOLIC BLOOD PRESSURE: 95 MMHG | SYSTOLIC BLOOD PRESSURE: 130 MMHG | RESPIRATION RATE: 18 BRPM | TEMPERATURE: 97.8 F | WEIGHT: 259 LBS | HEIGHT: 70 IN

## 2018-08-27 PROCEDURE — A9270 NON-COVERED ITEM OR SERVICE: HCPCS | Performed by: OBSTETRICS & GYNECOLOGY

## 2018-08-27 PROCEDURE — 700102 HCHG RX REV CODE 250 W/ 637 OVERRIDE(OP): Performed by: OBSTETRICS & GYNECOLOGY

## 2018-08-27 PROCEDURE — 700102 HCHG RX REV CODE 250 W/ 637 OVERRIDE(OP): Performed by: ADVANCED PRACTICE MIDWIFE

## 2018-08-27 PROCEDURE — A9270 NON-COVERED ITEM OR SERVICE: HCPCS | Performed by: ADVANCED PRACTICE MIDWIFE

## 2018-08-27 RX ORDER — LABETALOL 300 MG/1
300 TABLET, FILM COATED ORAL EVERY 8 HOURS
Qty: 90 TAB | Refills: 1 | Status: SHIPPED | OUTPATIENT
Start: 2018-08-27

## 2018-08-27 RX ORDER — PSEUDOEPHEDRINE HCL 30 MG
100 TABLET ORAL
Qty: 60 CAP | Refills: 1 | Status: SHIPPED | OUTPATIENT
Start: 2018-08-27

## 2018-08-27 RX ADMIN — Medication 1 TABLET: at 06:10

## 2018-08-27 RX ADMIN — LABETALOL HCL 300 MG: 300 TABLET, FILM COATED ORAL at 02:01

## 2018-08-27 RX ADMIN — NIFEDIPINE 60 MG: 60 TABLET, FILM COATED, EXTENDED RELEASE ORAL at 06:17

## 2018-08-27 RX ADMIN — HYDROCODONE BITARTRATE AND ACETAMINOPHEN 1 TABLET: 5; 325 TABLET ORAL at 04:50

## 2018-08-27 RX ADMIN — LABETALOL HCL 300 MG: 300 TABLET, FILM COATED ORAL at 10:46

## 2018-08-27 ASSESSMENT — PAIN SCALES - GENERAL: PAINLEVEL_OUTOF10: 4

## 2018-08-27 NOTE — PROGRESS NOTES
Assessment completed WDL. Pt denies need for medication at this time. Plan of care discussed. Pt encouraged to call with needs.

## 2018-08-27 NOTE — PROGRESS NOTES
Assessment complete. POC discussed and patient verbalized understanding. Patient is pumping for infant in NICU. Pumping instructions reviewed and patient encouraged to begin double pumping as she has previously been pumping one breast at a time. She is pumping for 15 minutes at 80 CPM for the first 2 minutes and then decreasing to 50 CPM, with suction set to 35%. All questions answered. Will continue to assess.

## 2018-08-27 NOTE — DISCHARGE SUMMARY
Discharge Summary    Name:   Seema Araujo   Date/Time:  2018 - 9:32 AM  Chief Admitting Dx:  IUP @ 34 weeks, severe Pre-E;  delivery  Discharge Dx:             IUP @ 34 weeks, severe Pre-E;  delivery; S/P   Delivery Type:  vaginal, spontaneous  Post-Op/Post Partum Days #:  2    Subjective:  Abdominal pain: no  Ambulating:   yes  Tolerating liquids:  yes  Tolerating food:  yes common adult  Flatus:   yes  BM:    no  Bleeding:   with a small amount of bleeding  Voiding:   yes  Dizziness:   no  Feeding:   breast    Vitals:    18 0001 18 0201 18 0400 18 0610   BP: 110/71 135/98 131/88 108/79   Pulse: 74 75 81 68   Resp: 18  18    Temp: 37 °C (98.6 °F)  36.9 °C (98.5 °F)    TempSrc:       SpO2: 97%  97%    Weight:       Height:           Exam:  Breast: Tenderness no  Abdomen: Abdomen soft, non-tender. BS normal. No masses,  No organomegaly  Fundal Tenderness:  no  Fundus Firm: yes  Incision: none  Below umbilicus: yes  Perineum: perineum intact  Lochia: mild  Extremities: Normal extremities, peripheral pulses and reflexes normal    Meds:  Current Facility-Administered Medications   Medication Dose   • NIFEdipine SR (PROCARDIA-XL) tablet 60 mg  60 mg   • acetaminophen (TYLENOL) tablet 325 mg  325 mg   • HYDROcodone-acetaminophen (NORCO) 5-325 MG per tablet 2 Tab  2 Tab   • prenatal plus vitamin (STUARTNATAL 1+1) 27-1 MG tablet 1 Tab  1 Tab   • labetalol (NORMODYNE) tablet 300 mg  300 mg   • oxytocin (PITOCIN) 20 UNITS/1000ML LR (postpartum)   mL/hr   • acetaminophen (TYLENOL) tablet 650 mg  650 mg   • docusate sodium (COLACE) capsule 100 mg  100 mg       Labs:   Recent Labs      18   0616  18   0616   WBC  18.5*  19.0*   RBC  4.12*  4.18*   HEMOGLOBIN  13.2  13.2   HEMATOCRIT  39.1  39.4   MCV  94.9  94.3   MCH  32.0  31.6   MCHC  33.8  33.5*   RDW  45.2  45.3   PLATELETCT  141*  157*   MPV  11.8  12.0       Assessment:  Chief Admitting Dx:  Pregnancy  Labor  and delivery complications, antepartum  Labor and delivery complications, antepartum  Delivery Type:  vaginal, spontaneous  Tubal Ligation:  no    Plan:  Continue routine post partum care.  Discharge home today  Follow up Thursday or Friday for a BP check  Follow up in 2 weeks for postpartum check  Follow up in 6 weeks for postpartum check    Patient's blood type is A+ and, therefore, does not require Rhogam.     LOKESH Aguillon.[email or web address], ALESHAM     Total time 20 minutes

## 2018-08-27 NOTE — PROGRESS NOTES
"Met with this mother of a NICU baby born at 33 weeks gestation. She has been pumping consistently and getting a few mls of milk. Discussed her settings and comfort. Settings: 80-60, sx of about 35% for 15 min. Encouraged her to rent a HG pump for the first 1-2 weeks even though she has a \"Medela\" pump at home. She's not sure what model it is. Reminded her to take her pump tubing home and use it with the pumps in the NICU and pump at baby's bedside. Will follow up with her in the NICU.  "

## 2018-08-27 NOTE — DISCHARGE INSTRUCTIONS
POSTPARTUM DISCHARGE INSTRUCTIONS FOR MOM    YOB: 1995   Age: 23 y.o.               Admit Date: 8/20/2018     Discharge Date: 8/27/2018  Attending Doctor:  Abe Yi M.D.                  Allergies:  Other food and Farnam    Discharged to home by car. Discharged via wheelchair, hospital escort: Yes.  Special equipment needed: Not Applicable  Belongings with: Personal  Be sure to schedule a follow-up appointment with your primary care doctor or any specialists as instructed.     Discharge Plan:   Diet Plan: Discussed  Activity Level: Discussed  Confirmed Follow up Appointment: Patient to Call and Schedule Appointment  Confirmed Symptoms Management: Discussed  Medication Reconciliation Updated: Yes  Influenza Vaccine Indication: Indicated: Not available from distributor/    REASONS TO CALL YOUR OBSTETRICIAN:  1.   Persistent fever or shaking chills (Temperature higher than 100.4)  2.   Heavy bleeding (soaking more than 1 pad per hour); Passing clots  3.   Foul odor from vagina  4.   Mastitis (Breast infection; breast pain, chills, fever, redness)  5.   Urinary pain, burning or frequency  6.   Episiotomy infection  7.   Severe depression longer than 24 hours    HAND WASHING  · Prior to handling the baby.  · Before breastfeeding or bottle feeding baby.  · After using the bathroom or changing the baby's diaper.    VAGINAL CARE  · Nothing inside vagina for 6 weeks: no sexual intercourse, tampons or douching.  · Bleeding may continue for 2-4 weeks.  Amount may vary.    · Call your physician for heavy bleeding which means soaking more than 1 pad per hour    BIRTH CONTROL  · It is possible to become pregnant at any time after delivery and while breastfeeding.  · Plan to discuss a method of birth control with your physician at your follow up visit. visit.    DIET AND ELIMINATION  · Eating more fiber (bran cereal, fruits, and vegetables) and drinking plenty of fluids will help to avoid  "constipation.  · Urinary frequency after childbirth is normal.    POSTPARTUM BLUES  During the first few days after birth, you may experience a sense of the \"blues\" which may include impatience, irritability or even crying.  These feeling come and go quickly.  However, as many as 1 in 10 women experience emotional symptoms known as postpartum depression.    Postpartum depression:  May start as early as the second or third day after delivery or take several weeks or months to develop.  Symptoms of \"blues\" are present, but are more intense:  Crying spells; loss of appetite; feelings of hopelessness or loss of control; fear of touching the baby; over concern or no concern at all about the baby; little or no concern about your own appearance/caring for yourself; and/or inability to sleep or excessive sleeping.  Contact your physician if you are experiencing any of these symptoms.    Crisis Hotline:  · [place] Crisis Hotline:  8-827-DSOCJWH  Or 1-105.161.9248  · [place] Crisis Hotline:  1-437.220.7716  Or 794-885-6545    PREVENTING SHAKEN BABY:  If you are angry or stressed, PUT THE BABY IN THE CRIB, step away, take some deep breaths, and wait until you are calm to care for the baby.  DO NOT SHAKE THE BABY.  You are not alone, call a supporter for help.    · Crisis Call Center 24/7 crisis line 127-197-6469 or 1-224.734.9161  · You can also text them, text \"ANSWER\" to 415170    QUIT SMOKING/TOBACCO USE:  I understand the use of any tobacco products increases my chance of suffering from future heart disease and could cause other illnesses which may shorten my life. Quitting the use of tobacco products is the single most important thing I can do to improve my health. For further information on smoking / tobacco cessation call a Toll Free Quit Line at 1-139.126.8376 (*National [employer] [place]) or 1-841.208.5157 (American Lung Association) or you can access the web based program at www.lungusa.org.    · [place] Tobacco Users " Help Line:  (203) 219-8313       Toll Free: 1-428.632.3110  · Quit Tobacco Program [facility] (814)184-2425    DEPRESSION / SUICIDE RISK:  As you are discharged from this [facility], it is important to learn how to keep safe from harming yourself.    Recognize the warning signs:  · Abrupt changes in personality, positive or negative- including increase in energy   · Giving away possessions  · Change in eating patterns- significant weight changes-  positive or negative  · Change in sleeping patterns- unable to sleep or sleeping all the time   · Unwillingness or inability to communicate  · Depression  · Unusual sadness, discouragement and loneliness  · Talk of wanting to die  · Neglect of personal appearance   · Rebelliousness- reckless behavior  · Withdrawal from people/activities they love  · Confusion- inability to concentrate     If you or a loved one observes any of these behaviors or has concerns about self-harm, here's what you can do:  · Talk about it- your feelings and reasons for harming yourself  · Remove any means that you might use to hurt yourself (examples: pills, rope, extension cords, firearm)  · Get professional help from the community (Mental Health, Substance Abuse, psychological counseling)  · Do not be alone:Call your Safe Contact- someone whom you trust who will be there for you.  · Call your local CRISIS HOTLINE 760-0065 or 386-293-1625  · Call your local Children's Mobile Crisis Response Team [place] (328) 848-1668 or www.[employer]  · Call the toll free National Suicide Prevention Hotlines   · National Suicide Prevention Lifeline 741-521-BGFH (9054)  · National Hope Line Network 800-SUICIDE (598-7495)    DISCHARGE SURVEY:  Thank you for choosing [facility].  We hope we provided you with very good care.  You may be receiving a survey in the mail.  Please fill it out.  Your opinion is valuable to us.    ADDITIONAL EDUCATIONAL MATERIALS GIVEN TO  PATIENT:        My signature on this form indicates that:  1.  I have reviewed and understand the above information  2.  My questions regarding this information have been answered to my satisfaction.  3.  I have formulated a plan with my discharge nurse to obtain my prescribed medication for home.